# Patient Record
Sex: MALE
[De-identification: names, ages, dates, MRNs, and addresses within clinical notes are randomized per-mention and may not be internally consistent; named-entity substitution may affect disease eponyms.]

---

## 2018-11-12 ENCOUNTER — HOSPITAL ENCOUNTER (EMERGENCY)
Dept: HOSPITAL 14 - H.ER | Age: 38
Discharge: HOME | End: 2018-11-12
Payer: COMMERCIAL

## 2018-11-12 VITALS
TEMPERATURE: 98.5 F | DIASTOLIC BLOOD PRESSURE: 88 MMHG | HEART RATE: 65 BPM | SYSTOLIC BLOOD PRESSURE: 135 MMHG | OXYGEN SATURATION: 100 % | RESPIRATION RATE: 18 BRPM

## 2018-11-12 DIAGNOSIS — S05.01XA: Primary | ICD-10-CM

## 2018-11-12 DIAGNOSIS — Z88.0: ICD-10-CM

## 2018-11-12 DIAGNOSIS — Z23: ICD-10-CM

## 2018-11-12 NOTE — ED PDOC
HPI: Eye Injury/Pain


Time Seen by Provider: 11/12/18 17:52


Chief Complaint (Nursing): Eye Problem


Chief Complaint (Provider): Right Eye Pain


History Per: Patient


History/Exam Limitations: no limitations


Onset/Duration Of Symptoms: Days


Current Symptoms Are (Timing): Still Present


Quality: "Pain"


Associated Symptoms: FB Sensation, Discharge From Eye.  denies: Decreased Vision


Additional Complaint(s): 


38 year old male with no significant PMHx presents to the ER for an evaluation 

of right eye. Patient is a  and states he was at work today using 

glasses and suddenly felt something going into his right eye. He tried to flush 

water into his eye at work and when taking a shower at home. He continues to 

have pain, tearing and feels something in his eye. Denies decreased vision, 

seeing double, nausea or vomiting. Patient is unsure of his last tetanus shot. 








Past Medical History


Reviewed: Historical Data, Nursing Documentation, Vital Signs


Vital Signs: 


                                Last Vital Signs











Temp  98.5 F   11/12/18 17:02


 


Pulse  65   11/12/18 17:02


 


Resp  18   11/12/18 17:02


 


BP  135/88   11/12/18 17:02


 


Pulse Ox  100   11/12/18 17:02














- Medical History


PMH: No Chronic Diseases





- Family History


Family History: States: Unknown Family Hx





- Social History


Current smoker - smoking cessation education provided: No


Alcohol: Occasional


Drugs: Denies





- Home Medications


Home Medications: 


                                Ambulatory Orders











 Medication  Instructions  Recorded


 


Erythromycin 0.5% [Erythromycin] 0.5 in OD QID 5 Days  tube 11/12/18














- Allergies


Allergies/Adverse Reactions: 


                                    Allergies











Allergy/AdvReac Type Severity Reaction Status Date / Time


 


Penicillins Allergy  RASH Verified 11/12/18 17:02














Review of Systems


ROS Statement: Except As Marked, All Systems Reviewed And Found Negative


Eyes: Positive for: Other (right eye irritation ).  Negative for: Vision Change


Gastrointestinal: Negative for: Nausea, Vomiting


Neurological: Negative for: Headache, Dizziness





Physical Exam





- Reviewed


Nursing Documentation Reviewed: Yes


Vital Signs Reviewed: Yes





- Physical Exam


Appears: Positive for: Uncomfortable


Head Exam: Positive for: ATRAUMATIC, NORMAL INSPECTION, NORMOCEPHALIC


Skin: Positive for: Normal Color, Warm, Dry.  Negative for: Rash


Eye Exam: Positive for: EOMI, PERRL, Conjunctival injection (right eye), Other 

(right eye lacrimating, no foreign bodies noticed on visual inspection, eyelid 

inverted, mildly injected no foreign bodies or lesions )


Neck: Positive for: Normal, Painless ROM


Neurologic/Psych: Positive for: Alert, Oriented (x3).  Negative for: 

Motor/Sensory Deficits





- ECG


O2 Sat by Pulse Oximetry: 100 (RA)


Pulse Ox Interpretation: Normal





Medical Decision Making


Medical Decision Making: 


Time: 1809


Initial Plan:


Tetracaine 0.5% Ophth 6 drops


Tetracaine 0.5% Ophth 10 drops


Tetanus 0.5ml


Reevaluation 





Visual Acuity test: 


20/20 on right and left eye 


20/13 for both eyes





Visualized under lamp after tetracaine and there is a mild uptake on lateral 

portion of cornea with very minimal increased uptake superior, possible 

abrasion. 


Case discussed with Dr. Zepeda and he advised to use pressure dressing, 

antibiotic eye ointment and follow up with him in his office at 9am. 





Upon provider evaluation patient is medically stable, and requires no further 

treatment in the ED at this time. Patient will be discharged home with 

Erythromycin 0.5%. Counseling was provided and all questions were answered 

regarding diagnosis and need for follow up with Dr. Zepeda. There is agreement 

to discharge plan. Return if symptoms persist or worsen.


 

--------------------------------------------------------------------------------


-----------------   


Scribe Attestation:


Documented by Ivania Turner, acting as a scribe for Denise Nuñez PA-C





Provider Scribe Attestation:


All medical record entries made by the Scribe were at my direction and 

personally dictated by me. I have reviewed the chart and agree that the record 

accurately reflects my personal performance of the history, physical exam, 

medical decision making, and the department course for this patient. I have also

 personally directed, reviewed, and agree with the discharge instructions and 

disposition.








Disposition





- Clinical Impression


Clinical Impression: 


 Corneal abrasion, right








- Patient ED Disposition


Is Patient to be Admitted: No


Discussed With DrAva: Stanton Zepeda


Doctor Will See Patient In The: Office


Counseled Patient/Family Regarding: Studies Performed, Diagnosis, Need For 

Followup





- Disposition


Referrals: 


Stanton Zepeda MD [Staff Provider] - 


Disposition: Routine/Home


Disposition Time: 19:48


Condition: STABLE


Additional Instructions: 


F/u with Dr. Zepeda in his office at 9am tomorrow morning. Keep pressure 

dressing on Right eye and use antibiotic eye ointment as prescribed. 


Prescriptions: 


Erythromycin 0.5% [Erythromycin] 0.5 in OD QID 5 Days  tube


Instructions:  Corneal Abrasion (DC)


Forms:  [facility] (English), [facility] ED School/Work Excuse


Print Language: ENGLISH